# Patient Record
(demographics unavailable — no encounter records)

---

## 2025-01-08 NOTE — SOCIAL HISTORY
[Apartment] : [unfilled] lives in an apartment  [Cat] : cat [Smokers in Household] : there are no smokers in the home [de-identified] : wood floor

## 2025-01-08 NOTE — CONSULT LETTER
[Dear  ___] : Dear  [unfilled], [Courtesy Letter:] : I had the pleasure of seeing your patient, [unfilled], in my office today. [Please see my note below.] : Please see my note below. [Consult Closing:] : Thank you very much for allowing me to participate in the care of this patient.  If you have any questions, please do not hesitate to contact me. [Sincerely,] : Sincerely, [FreeTextEntry2] : Dr. Alyssa Ye [FreeTextEntry3] : Varsha Frausto MD, FAAAAI, FACTREVAI\par  Associate , \par  Assistant Fellowship Training ,\par  Director, Food Allergy Center and [facility]\par  Division of Allergy and Immunology\par  [facility]\par  [facility]\par  , Pediatrics and Medicine\par  [facility]\par  [address], [address]\[place]  [place] [address]\par  [phone]\par

## 2025-01-08 NOTE — HISTORY OF PRESENT ILLNESS
[de-identified] : Anita is a 4 year old girl with multiple food allergies who presents for follow-up of food allergies.   Hx of snoring at night and chronic nasal congestion. Tested for celiac disease via blood work and this test was normal but mother decided to cut out gluten from her diet. PMD also sent allergen testing to many foods. Blood test sent several weeks ago.  Avoiding wheat, soy, sesame, peanut, tree nuts milk, garlic, eggs, chicken. Eats fish, shellfish, beef, oat, rice, corn.  Allergic to roaches and dust mite. Dairy - emesis. Used to tolerate soy - stopped feeding her this after the positive test. tree nuts - never ingested Milk - emesis within 15 minutes. Tolerated cheese and yogurt. Eggs - hives and swelling. Sesame - used to ingest - stopped a few weeks ago.  Used to eat peanut consistently  - stopped this because one day she ate a hamburger cooked in peanut oil - touched her eye and got hives.  At 9 months she ate rice and beans that also contained chicken. Broke out into hives, had swelling.  No hx of asthma, wheezing or cough.  Has had gastroenteritis in the past.   Went to [place] ([place] - when she came back she began to have emesis. Hx of emesis every other week that persisted for a few months.   Hx of spine surgery years ago at age 9 months.   Hx of eczema - drastically improved as per mom.  Had pneumonia 2 weeks ago - completed a course of amoxicillin and 1 other antibiotic.  2022: Still having issues with chicken and lightly cooked egg exposure.   When dad ate chicken, and kissed her, she got hives around the belly. Still eating some baked goods but doesn't like it as much. Having dairy based yogurt, heavy cream in sauce, yogurt drinks and cheese without  problem but doesn't eat unheated milk.  Last year, had itchy eyes and runny nose in the fall season.  Eating garlic and peanuts, peanut butter now.  Avoiding unheated milk.

## 2025-01-08 NOTE — IMPRESSION
[Allergy Testing Dust Mite] : dust mites [Allergy Testing Mixed Feathers] : feathers [Allergy Testing Cockroach] : cockroach [Allergy Testing Dog] : dog [Allergy Testing Cat] : cat [Allergy Testing Trees] : trees [Allergy Testing Weeds] : weeds [Allergy Testing Grasses] : grasses [_____] : egg ([unfilled]) [] : milk [________] : [unfilled]

## 2025-01-08 NOTE — REVIEW OF SYSTEMS
[Nl] : Respiratory [Immunizations are up to date] : Immunizations are up to date [Received Influenza Vaccine this Past Year] : Patient has received the Influenza vaccine this past year [Urticaria] : no urticaria [Swelling] : no swelling [FreeTextEntry4] : see HPI

## 2025-01-08 NOTE — HISTORY OF PRESENT ILLNESS
[de-identified] : Anita is a 4 year old girl with multiple food allergies who presents for follow-up of food allergies.   Hx of snoring at night and chronic nasal congestion. Tested for celiac disease via blood work and this test was normal but mother decided to cut out gluten from her diet. PMD also sent allergen testing to many foods. Blood test sent several weeks ago.  Avoiding wheat, soy, sesame, peanut, tree nuts milk, garlic, eggs, chicken. Eats fish, shellfish, beef, oat, rice, corn.  Allergic to roaches and dust mite. Dairy - emesis. Used to tolerate soy - stopped feeding her this after the positive test. tree nuts - never ingested Milk - emesis within 15 minutes. Tolerated cheese and yogurt. Eggs - hives and swelling. Sesame - used to ingest - stopped a few weeks ago.  Used to eat peanut consistently  - stopped this because one day she ate a hamburger cooked in peanut oil - touched her eye and got hives.  At 9 months she ate rice and beans that also contained chicken. Broke out into hives, had swelling.  No hx of asthma, wheezing or cough.  Has had gastroenteritis in the past.   Went to [place] ([place]) - when she came back she began to have emesis. Hx of emesis every other week that persisted for a few months.   Hx of spine surgery years ago at age 9 months.   Hx of eczema - drastically improved as per mom.  Had pneumonia 2 weeks ago - completed a course of amoxicillin and 1 other antibiotic.  2022: Still having issues with chicken and lightly cooked egg exposure.   When dad ate chicken, and kissed her, she got hives around the belly. Still eating some baked goods but doesn't like it as much. Having dairy based yogurt, heavy cream in sauce, yogurt drinks and cheese without  problem but doesn't eat unheated milk.  Last year, had itchy eyes and runny nose in the fall season.  Eating garlic and peanuts, peanut butter now.  Avoiding unheated milk.

## 2025-01-08 NOTE — SOCIAL HISTORY
[Apartment] : [unfilled] lives in an apartment  [Cat] : cat [Smokers in Household] : there are no smokers in the home [de-identified] : wood floor

## 2025-01-08 NOTE — CONSULT LETTER
[Dear  ___] : Dear  [unfilled], [Courtesy Letter:] : I had the pleasure of seeing your patient, [unfilled], in my office today. [Please see my note below.] : Please see my note below. [Consult Closing:] : Thank you very much for allowing me to participate in the care of this patient.  If you have any questions, please do not hesitate to contact me. [Sincerely,] : Sincerely, [FreeTextEntry2] : Dr. Alyssa Ye [FreeTextEntry3] : Varsha Frausto MD, FAAAAI, FACTREVAI\par  Associate , \par  Assistant Fellowship Training ,\par  Director, Food Allergy Center and [facility]\par  Division of Allergy and Immunology\par  [facility]\par  [facility]\par  , Pediatrics and Medicine\par  [facility]\par  [address], [address]\[place] [address]\par  [phone]\par

## 2025-01-08 NOTE — PHYSICAL EXAM
[Alert] : alert [Well Nourished] : well nourished [No Acute Distress] : no acute distress [Well Developed] : well developed [No Discharge] : no discharge [Normal Outer Ear/Nose] : the ears and nose were normal in appearance [Clear Rhinorrhea] : clear rhinorrhea was seen [Normal Rate and Effort] : normal respiratory rhythm and effort [No Retractions] : no retractions [Skin Intact] : skin intact  [Judgment and Insight Age Appropriate] : judgement and insight is age appropriate [Alert, Awake, Oriented as Age-Appropriate] : alert, awake, oriented as age appropriate [Conjunctival Erythema] : no conjunctival erythema [Wheezing] : no wheezing was heard [Patches] : no patches [de-identified] : becka [de-identified] : dried nasal discharge

## 2025-01-08 NOTE — PHYSICAL EXAM
[Alert] : alert [Well Nourished] : well nourished [No Acute Distress] : no acute distress [Well Developed] : well developed [No Discharge] : no discharge [Normal Outer Ear/Nose] : the ears and nose were normal in appearance [Clear Rhinorrhea] : clear rhinorrhea was seen [Normal Rate and Effort] : normal respiratory rhythm and effort [No Retractions] : no retractions [Skin Intact] : skin intact  [Judgment and Insight Age Appropriate] : judgement and insight is age appropriate [Alert, Awake, Oriented as Age-Appropriate] : alert, awake, oriented as age appropriate [Conjunctival Erythema] : no conjunctival erythema [Wheezing] : no wheezing was heard [Patches] : no patches [de-identified] : becka [de-identified] : dried nasal discharge

## 2025-04-25 NOTE — PROCEDURE
[FreeTextEntry1] : Patient came in accompanied by both parents for FTP to milk. Seen and examined by Dr. Falk. Patient is well appearing, chest sounds clear, good air entry bilateral, skin is clean, clear dry and intact. Mom brought Chau Donut whole milk. Explained procedure to mom that I will be giving 3 increasing doses of milk for a total of 4oz, verbalized understanding of all instructions. Patient tolerated all doses well, the last dose was mixed with [place] chocolate syrup. 4:45pm - Patient completed 90 minutes of observation, no untoward reaction noted. VSS. Seen by Dr. Falk discharged home in good condition, accompanied by both parents.

## 2025-04-25 NOTE — HISTORY OF PRESENT ILLNESS
[Consent obtained and signed form scanned in to chart] : Consent obtained and signed form scanned in to chart [] : The following medications are to be available during the challenge procedure: [___] : HR: [unfilled]  [_______] : Time: [unfilled] [Clear] : Skin Findings: Clear [No] : Reaction: No [____] : IVB: [unfilled] [___] : Amount: [unfilled] [___% 1) Skin -  A) Erythematous rash - % area involved] : Erythematous Rash (IA): [unfilled] % area involved [0 Pruritus: 0  - absent] : Pruritus (IB): 0 - absent [0 Urticaria/Angioedema: 0 - Absent] : Urticaria/Angioedema (IC): 0  - Absent [0 Rash: 0 - Absent] : Rash (ID): 0 - Absent [0 Sneezing/Itchin - Absent] : Sneezing/Itching (IIA): 0 - Absent [0 Nasal congestion: 0 - Absent] : Nasal congestion (IIB): 0 - Absent [0 Rhinorrhea: 0 - Absent] : Rhinorrhea (IIC): 0 - Absent [0 Laryngeal: 0 - Absent] : Laryngeal (IID): 0 - Absent [0 Wheezin - Absent] : Wheezing (IIIA): 0 - Absent [0 Gastro-Subjective complaints: 0 - Absent] : Gastro-Subjective Complaints (MURALI): 0 - Absent [0 Gastro-Objective complaints: 0 - Absent] : Gastro-Objective Complaints (IVB): 0 - Absent [Antihistamine use in past 5 days] : No antihistamine use in past 5 days [Recent Illness] : no recent illness [Fever] : no fever [Asthma] : no asthma [Diphenhydramine] : Diphenhydramine, 1-2mg/kg IM (max dose 50mg), (50mg/1 cc) [___ mg] : Dose: [unfilled] mg [Epinephrine 1:1000 IM] : Epinephrine 1:1000 IM, 0.01cc/kg (max dose 0.5 cc) [___ cc] : Volume: [unfilled] cc [FreeTextEntry1] : Chau [employer] Whole Milk [FreeTextEntry2] : 4 oz = 120ml [FreeTextEntry3] : BP 91/61, O2 Sats 98%RA [FreeTextEntry4] : BP 86/53, O2 Sats 98%RA [FreeTextEntry5] : BP 94/61, O2 Sats 98%RA [de-identified] : /55, , O2 Sats 98%RA

## 2025-04-25 NOTE — PROCEDURE
[FreeTextEntry1] : Patient came in accompanied by both parents for FTP to milk. Seen and examined by Dr. Falk. Patient is well appearing, chest sounds clear, good air entry bilateral, skin is clean, clear dry and intact. Mom brought Chau Donut whole milk. Explained procedure to mom that I will be giving 3 increasing doses of milk for a total of 4oz, verbalized understanding of all instructions. Patient tolerated all doses well, the last dose was mixed with [place] chocolate syrup. 4:45pm - Patient completed 90 minutes of observation, no untoward reaction noted. VSS. Seen by Dr. Falk discharged home in good condition, accompanied by both parents.

## 2025-04-25 NOTE — HISTORY OF PRESENT ILLNESS
[Consent obtained and signed form scanned in to chart] : Consent obtained and signed form scanned in to chart [] : The following medications are to be available during the challenge procedure: [___] : HR: [unfilled]  [_______] : Time: [unfilled] [Clear] : Skin Findings: Clear [No] : Reaction: No [____] : IVB: [unfilled] [___] : Amount: [unfilled] [___% 1) Skin -  A) Erythematous rash - % area involved] : Erythematous Rash (IA): [unfilled] % area involved [0 Pruritus: 0  - absent] : Pruritus (IB): 0 - absent [0 Urticaria/Angioedema: 0 - Absent] : Urticaria/Angioedema (IC): 0  - Absent [0 Rash: 0 - Absent] : Rash (ID): 0 - Absent [0 Sneezing/Itchin - Absent] : Sneezing/Itching (IIA): 0 - Absent [0 Nasal congestion: 0 - Absent] : Nasal congestion (IIB): 0 - Absent [0 Rhinorrhea: 0 - Absent] : Rhinorrhea (IIC): 0 - Absent [0 Laryngeal: 0 - Absent] : Laryngeal (IID): 0 - Absent [0 Wheezin - Absent] : Wheezing (IIIA): 0 - Absent [0 Gastro-Subjective complaints: 0 - Absent] : Gastro-Subjective Complaints (MURALI): 0 - Absent [0 Gastro-Objective complaints: 0 - Absent] : Gastro-Objective Complaints (IVB): 0 - Absent [Antihistamine use in past 5 days] : No antihistamine use in past 5 days [Recent Illness] : no recent illness [Fever] : no fever [Asthma] : no asthma [Diphenhydramine] : Diphenhydramine, 1-2mg/kg IM (max dose 50mg), (50mg/1 cc) [___ mg] : Dose: [unfilled] mg [Epinephrine 1:1000 IM] : Epinephrine 1:1000 IM, 0.01cc/kg (max dose 0.5 cc) [___ cc] : Volume: [unfilled] cc [FreeTextEntry1] : Chau [employer] Whole Milk [FreeTextEntry2] : 4 oz = 120ml [FreeTextEntry3] : BP 91/61, O2 Sats 98%RA [FreeTextEntry4] : BP 86/53, O2 Sats 98%RA [FreeTextEntry5] : BP 94/61, O2 Sats 98%RA [de-identified] : /55, , O2 Sats 98%RA

## 2025-04-25 NOTE — HISTORY OF PRESENT ILLNESS
[Consent obtained and signed form scanned in to chart] : Consent obtained and signed form scanned in to chart [] : The following medications are to be available during the challenge procedure: [___] : HR: [unfilled]  [_______] : Time: [unfilled] [Clear] : Skin Findings: Clear [No] : Reaction: No [____] : IVB: [unfilled] [___] : Amount: [unfilled] [___% 1) Skin -  A) Erythematous rash - % area involved] : Erythematous Rash (IA): [unfilled] % area involved [0 Pruritus: 0  - absent] : Pruritus (IB): 0 - absent [0 Urticaria/Angioedema: 0 - Absent] : Urticaria/Angioedema (IC): 0  - Absent [0 Rash: 0 - Absent] : Rash (ID): 0 - Absent [0 Sneezing/Itchin - Absent] : Sneezing/Itching (IIA): 0 - Absent [0 Nasal congestion: 0 - Absent] : Nasal congestion (IIB): 0 - Absent [0 Rhinorrhea: 0 - Absent] : Rhinorrhea (IIC): 0 - Absent [0 Laryngeal: 0 - Absent] : Laryngeal (IID): 0 - Absent [0 Wheezin - Absent] : Wheezing (IIIA): 0 - Absent [0 Gastro-Subjective complaints: 0 - Absent] : Gastro-Subjective Complaints (MURALI): 0 - Absent [0 Gastro-Objective complaints: 0 - Absent] : Gastro-Objective Complaints (IVB): 0 - Absent [Antihistamine use in past 5 days] : No antihistamine use in past 5 days [Recent Illness] : no recent illness [Fever] : no fever [Asthma] : no asthma [Diphenhydramine] : Diphenhydramine, 1-2mg/kg IM (max dose 50mg), (50mg/1 cc) [___ mg] : Dose: [unfilled] mg [Epinephrine 1:1000 IM] : Epinephrine 1:1000 IM, 0.01cc/kg (max dose 0.5 cc) [___ cc] : Volume: [unfilled] cc [FreeTextEntry1] : Chau [employer] Whole Milk [FreeTextEntry2] : 4 oz = 120ml [FreeTextEntry3] : BP 91/61, O2 Sats 98%RA [FreeTextEntry4] : BP 86/53, O2 Sats 98%RA [FreeTextEntry5] : BP 94/61, O2 Sats 98%RA [de-identified] : /55, , O2 Sats 98%RA

## 2025-04-25 NOTE — PROCEDURE
[FreeTextEntry1] : Patient came in accompanied by both parents for FTP to milk. Seen and examined by Dr. Falk. Patient is well appearing, chest sounds clear, good air entry bilateral, skin is clean, clear dry and intact. Mom brought Chau Donut whole milk. Explained procedure to mom that I will be giving 3 increasing doses of milk for a total of 4oz, verbalized understanding of all instructions. Patient tolerated all doses well, the last dose was mixed with [place] chocolate syrup. 4:45pm - Patient completed 90 minutes of observation, no untoward reaction noted. VSS. Seen by Dr. Falk discharged home in good condition, accompanied by both parents.

## 2025-04-25 NOTE — PLAN
[FreeTextEntry1] : Anita is a 4 year old girl with multiple food allergies who passed an OFC to milk today. Advised parents to maintain milk protein in her diet at least 3 times a week to maintain tolerance.